# Patient Record
Sex: FEMALE | URBAN - NONMETROPOLITAN AREA
[De-identification: names, ages, dates, MRNs, and addresses within clinical notes are randomized per-mention and may not be internally consistent; named-entity substitution may affect disease eponyms.]

---

## 2022-04-01 ENCOUNTER — OFFICE VISIT (OUTPATIENT)
Dept: URBAN - NONMETROPOLITAN AREA CLINIC 14 | Facility: CLINIC | Age: 22
End: 2022-04-01
Payer: COMMERCIAL

## 2022-04-01 DIAGNOSIS — H52.13 MYOPIA, BILATERAL: Primary | ICD-10-CM

## 2022-04-01 PROCEDURE — 92310 CONTACT LENS FITTING OU: CPT | Performed by: OPTOMETRIST

## 2022-04-01 PROCEDURE — 92004 COMPRE OPH EXAM NEW PT 1/>: CPT | Performed by: OPTOMETRIST

## 2022-04-01 ASSESSMENT — INTRAOCULAR PRESSURE
OS: 16
OD: 17

## 2022-04-01 ASSESSMENT — VISUAL ACUITY
OD: 20/20
OS: 20/25

## 2022-04-01 NOTE — IMPRESSION/PLAN
Impression: Myopia, bilateral: H52.13. Plan: RX new specs and CL for FTW. Educated pt. on adaptation, use, and care of CLs. Instructed pt. to not sleep in CLs, follow 's replacement schedule, and use of proper solution. RTC 2 weeks for F/U.